# Patient Record
Sex: MALE | Race: BLACK OR AFRICAN AMERICAN | Employment: UNEMPLOYED | URBAN - METROPOLITAN AREA
[De-identification: names, ages, dates, MRNs, and addresses within clinical notes are randomized per-mention and may not be internally consistent; named-entity substitution may affect disease eponyms.]

---

## 2018-06-04 ENCOUNTER — HOSPITAL ENCOUNTER (EMERGENCY)
Age: 48
Discharge: HOME | End: 2018-06-04
Attending: EMERGENCY MEDICINE
Payer: MEDICARE

## 2018-06-04 VITALS
WEIGHT: 269 LBS | BODY MASS INDEX: 35.65 KG/M2 | SYSTOLIC BLOOD PRESSURE: 138 MMHG | OXYGEN SATURATION: 98 % | HEART RATE: 78 BPM | RESPIRATION RATE: 14 BRPM | HEIGHT: 73 IN | TEMPERATURE: 98.2 F | DIASTOLIC BLOOD PRESSURE: 82 MMHG

## 2018-06-04 DIAGNOSIS — R45.851 SUICIDAL THOUGHTS: Primary | ICD-10-CM

## 2018-06-04 LAB
AMPHET UR QL SCN: NEGATIVE
ANION GAP SERPL CALC-SCNC: 7 MMOL/L (ref 3–18)
APPEARANCE UR: CLEAR
BARBITURATES UR QL SCN: NEGATIVE
BASOPHILS # BLD: 0 K/UL (ref 0–0.1)
BASOPHILS NFR BLD: 0 % (ref 0–2)
BENZODIAZ UR QL: NEGATIVE
BILIRUB UR QL: NEGATIVE
BUN SERPL-MCNC: 11 MG/DL (ref 7–18)
BUN/CREAT SERPL: 12 (ref 12–20)
CALCIUM SERPL-MCNC: 9.2 MG/DL (ref 8.5–10.1)
CANNABINOIDS UR QL SCN: NEGATIVE
CHLORIDE SERPL-SCNC: 103 MMOL/L (ref 100–108)
CO2 SERPL-SCNC: 27 MMOL/L (ref 21–32)
COCAINE UR QL SCN: NEGATIVE
COLOR UR: YELLOW
CREAT SERPL-MCNC: 0.95 MG/DL (ref 0.6–1.3)
DIFFERENTIAL METHOD BLD: NORMAL
EOSINOPHIL # BLD: 0.2 K/UL (ref 0–0.4)
EOSINOPHIL NFR BLD: 3 % (ref 0–5)
ERYTHROCYTE [DISTWIDTH] IN BLOOD BY AUTOMATED COUNT: 14 % (ref 11.6–14.5)
ETHANOL SERPL-MCNC: <3 MG/DL (ref 0–3)
GLUCOSE SERPL-MCNC: 101 MG/DL (ref 74–99)
GLUCOSE UR STRIP.AUTO-MCNC: NEGATIVE MG/DL
HCT VFR BLD AUTO: 40.1 % (ref 36–48)
HDSCOM,HDSCOM: NORMAL
HGB BLD-MCNC: 13.5 G/DL (ref 13–16)
HGB UR QL STRIP: NEGATIVE
KETONES UR QL STRIP.AUTO: NEGATIVE MG/DL
LEUKOCYTE ESTERASE UR QL STRIP.AUTO: NEGATIVE
LYMPHOCYTES # BLD: 2.2 K/UL (ref 0.9–3.6)
LYMPHOCYTES NFR BLD: 24 % (ref 21–52)
MCH RBC QN AUTO: 28 PG (ref 24–34)
MCHC RBC AUTO-ENTMCNC: 33.7 G/DL (ref 31–37)
MCV RBC AUTO: 83 FL (ref 74–97)
METHADONE UR QL: NEGATIVE
MONOCYTES # BLD: 0.4 K/UL (ref 0.05–1.2)
MONOCYTES NFR BLD: 5 % (ref 3–10)
NEUTS SEG # BLD: 6.5 K/UL (ref 1.8–8)
NEUTS SEG NFR BLD: 68 % (ref 40–73)
NITRITE UR QL STRIP.AUTO: NEGATIVE
OPIATES UR QL: NEGATIVE
PCP UR QL: NEGATIVE
PH UR STRIP: 5.5 [PH] (ref 5–8)
PLATELET # BLD AUTO: 252 K/UL (ref 135–420)
PMV BLD AUTO: 9.9 FL (ref 9.2–11.8)
POTASSIUM SERPL-SCNC: 4.1 MMOL/L (ref 3.5–5.5)
PROT UR STRIP-MCNC: NEGATIVE MG/DL
RBC # BLD AUTO: 4.83 M/UL (ref 4.7–5.5)
SODIUM SERPL-SCNC: 137 MMOL/L (ref 136–145)
SP GR UR REFRACTOMETRY: 1.01 (ref 1–1.03)
UROBILINOGEN UR QL STRIP.AUTO: 0.2 EU/DL (ref 0.2–1)
WBC # BLD AUTO: 9.4 K/UL (ref 4.6–13.2)

## 2018-06-04 PROCEDURE — 80307 DRUG TEST PRSMV CHEM ANLYZR: CPT

## 2018-06-04 PROCEDURE — 85025 COMPLETE CBC W/AUTO DIFF WBC: CPT

## 2018-06-04 PROCEDURE — 99285 EMERGENCY DEPT VISIT HI MDM: CPT

## 2018-06-04 PROCEDURE — 80048 BASIC METABOLIC PNL TOTAL CA: CPT

## 2018-06-04 PROCEDURE — 81003 URINALYSIS AUTO W/O SCOPE: CPT

## 2018-06-04 NOTE — ED NOTES
I performed a brief evaluation, including history and physical, of the patient here in triage and I have determined that pt will need further treatment and evaluation from the main side ER physician. I have placed initial orders to help in expediting patients care.      June 04, 2018 at 3:12 PM - Debbie Bonner [place]

## 2018-06-04 NOTE — ED TRIAGE NOTES
Pt reports feeling sad and depressed and is having thoughts of hurting himself. Pt is without a plan. Pt does contract for safety.

## 2018-06-04 NOTE — ED PROVIDER NOTES
EMERGENCY DEPARTMENT HISTORY AND PHYSICAL EXAM    6:38 PM      Date: 6/4/2018  Patient Name: Alexandria Saleem    History of Presenting Illness     Chief Complaint   Patient presents with    Suicidal         History Provided By: Patient    Chief Complaint: Suicidal Ideations   Duration: Couple  Days  Timing:  Constant and Worsening  Location: N/A  Quality: N/A  Severity: N/A  Modifying Factors: None   Associated Symptoms: denies any other associated signs or symptoms      Additional History (Context): Alexandria Saleem is a 53 y.o. male with hx of DM, HLD and HIV presenting to the ED with c/o constant, worsening suicidal ideations that began a couple days ago. Pt reports suicidal thoughts began after he was released from [facility]. He states \"there are people accusing me of doing things, setting me up, they called the  on me and I was in [facility] for 2 months and 3 weeks for something I didn't do\". Denies HI, hallucinations, CP, SOB, fever, chills, abdominal pain, nausea, vomiting, diarrhea or urinary sx. As the patient is without complaints of pain, there is no severity of pain, quality of pain, modifying factors, or associated signs and symptoms regarding the pt's presenting complaint. No other sx or complaints given at this time. PCP: None        Past History     Past Medical History:  No past medical history on file. Past Surgical History:  No past surgical history on file. Family History:  No family history on file. Social History:  Social History   Substance Use Topics    Smoking status: Not on file    Smokeless tobacco: Not on file    Alcohol use Not on file       Allergies:  No Known Allergies      Review of Systems       Review of Systems   Constitutional: Negative for activity change and appetite change. HENT: Negative for congestion. Eyes: Negative for visual disturbance. Respiratory: Negative for cough and shortness of breath. Cardiovascular: Negative for chest pain.    Gastrointestinal: Negative for abdominal pain, diarrhea, nausea and vomiting. Genitourinary: Negative for dysuria. Musculoskeletal: Negative for arthralgias and myalgias. Skin: Negative for rash. Neurological: Negative for weakness and numbness. Psychiatric/Behavioral: Positive for suicidal ideas. Physical Exam     Visit Vitals    /82 (BP 1 Location: Left arm)    Pulse 78    Temp 98.2 °F (36.8 °C)    Resp 14    Ht 6' 1\" (1.854 m)    Wt 122 kg (269 lb)    SpO2 98%    BMI 35.49 kg/m2         Physical Exam   Constitutional: He is oriented to person, place, and time. He appears well-developed and well-nourished. HENT:   Head: Normocephalic and atraumatic. Mouth/Throat: Oropharynx is clear and moist.   Eyes: Conjunctivae are normal.   Neck: Normal range of motion. Cardiovascular: Normal rate. Pulmonary/Chest: Effort normal.   Abdominal: He exhibits no distension. Musculoskeletal: Normal range of motion. Neurological: He is alert and oriented to person, place, and time. Skin: Skin is warm and dry.    Psychiatric:   Suicidal without plan         Diagnostic Study Results     Labs -  Recent Results (from the past 12 hour(s))   URINALYSIS W/ RFLX MICROSCOPIC    Collection Time: 06/04/18  3:14 PM   Result Value Ref Range    Color YELLOW      Appearance CLEAR      Specific gravity 1.007 1.005 - 1.030      pH (UA) 5.5 5.0 - 8.0      Protein NEGATIVE  NEG mg/dL    Glucose NEGATIVE  NEG mg/dL    Ketone NEGATIVE  NEG mg/dL    Bilirubin NEGATIVE  NEG      Blood NEGATIVE  NEG      Urobilinogen 0.2 0.2 - 1.0 EU/dL    Nitrites NEGATIVE  NEG      Leukocyte Esterase NEGATIVE  NEG     DRUG SCREEN, URINE    Collection Time: 06/04/18  3:14 PM   Result Value Ref Range    BENZODIAZEPINES NEGATIVE  NEG      BARBITURATES NEGATIVE  NEG      THC (TH-CANNABINOL) NEGATIVE  NEG      OPIATES NEGATIVE  NEG      PCP(PHENCYCLIDINE) NEGATIVE  NEG      COCAINE NEGATIVE  NEG      AMPHETAMINES NEGATIVE  NEG      METHADONE NEGATIVE  NEG      HDSCOM (NOTE)    CBC WITH AUTOMATED DIFF    Collection Time: 06/04/18  6:45 PM   Result Value Ref Range    WBC 9.4 4.6 - 13.2 K/uL    RBC 4.83 4.70 - 5.50 M/uL    HGB 13.5 13.0 - 16.0 g/dL    HCT 40.1 36.0 - 48.0 %    MCV 83.0 74.0 - 97.0 FL    MCH 28.0 24.0 - 34.0 PG    MCHC 33.7 31.0 - 37.0 g/dL    RDW 14.0 11.6 - 14.5 %    PLATELET 831 657 - 280 K/uL    MPV 9.9 9.2 - 11.8 FL    NEUTROPHILS 68 40 - 73 %    LYMPHOCYTES 24 21 - 52 %    MONOCYTES 5 3 - 10 %    EOSINOPHILS 3 0 - 5 %    BASOPHILS 0 0 - 2 %    ABS. NEUTROPHILS 6.5 1.8 - 8.0 K/UL    ABS. LYMPHOCYTES 2.2 0.9 - 3.6 K/UL    ABS. MONOCYTES 0.4 0.05 - 1.2 K/UL    ABS. EOSINOPHILS 0.2 0.0 - 0.4 K/UL    ABS. BASOPHILS 0.0 0.0 - 0.1 K/UL    DF AUTOMATED     METABOLIC PANEL, BASIC    Collection Time: 06/04/18  6:45 PM   Result Value Ref Range    Sodium 137 136 - 145 mmol/L    Potassium 4.1 3.5 - 5.5 mmol/L    Chloride 103 100 - 108 mmol/L    CO2 27 21 - 32 mmol/L    Anion gap 7 3.0 - 18 mmol/L    Glucose 101 (H) 74 - 99 mg/dL    BUN 11 7.0 - 18 MG/DL    Creatinine 0.95 0.6 - 1.3 MG/DL    BUN/Creatinine ratio 12 12 - 20      GFR est AA >60 >60 ml/min/1.73m2    GFR est non-AA >60 >60 ml/min/1.73m2    Calcium 9.2 8.5 - 10.1 MG/DL   ETHYL ALCOHOL    Collection Time: 06/04/18  6:45 PM   Result Value Ref Range    ALCOHOL(ETHYL),SERUM <3 0 - 3 MG/DL       Radiologic Studies -   No orders to display         Medical Decision Making   I am the first provider for this patient. I reviewed the vital signs, available nursing notes, past medical history, past surgical history, family history and social history. Peggy Broderick is a 53 y.o. male with hx of DM, HLD and HIV presenting to the ED with c/o constant, worsening suicidal ideations that began a couple days ago. Pt reports suicidal thoughts began after he was released from nursing home. No plan. Appears well. Differential Diagnosis: suicidal thoughts without plan.  Do not suspect self harm or other acute illness. Testing: cbc, cmp, drug screen, etoh level  Treatments: psych eval pending medical clearance      Vital Signs-Reviewed the patient's vital signs. Pulse Oximetry Analysis -  99% on room air, stable     Cardiac Monitor:  Rate: 74  Rhythm:  Normal Sinus Rhythm     Records Reviewed: Nursing Notes and Old Medical Records (Time of Review: 6:38 PM)    ED Course: Progress Notes, Reevaluation, and Consults:    8:22 PM: Spoke with Crisis. Pt is medically cleared. 9:42 PM Consult: I discussed care with Crisis. It was a standard discussion including patient history, chief complaint, available diagnostic results, and predicted treatment course. States pt does not meet inpt criteria and he is to f/u with [facility] in [place] where he goes. Pt and family are in agreement with plan. Provider Notes (Medical Decision Making):       Diagnosis     Clinical Impression:   1. Suicidal thoughts        Disposition: Discharge     Follow-up Information     Follow up With Details Comments [address] [place] [address] [address] Schedule an appointment as soon as possible for a visit      [facility] EMERGENCY DEPT  If symptoms worsen [address] [place] [id number]  [phone]           Patient's Medications    No medications on file     _______________________________    Attestations:  Carlitos [address] [place] [address] acting as a scribe for and in the presence of Sherren Reilly, MD      June 04, 2018 at 6:38 PM       Provider Attestation:      I personally performed the services described in the documentation, reviewed the documentation, as recorded by the scribe in my presence, and it accurately and completely records my words and actions.  June 04, 2018 at 6:38 PM - Sherren Reilly, MD    _______________________________

## 2018-06-05 NOTE — DISCHARGE INSTRUCTIONS
Suicidal Thoughts and Behavior: Care Instructions  Your Care Instructions    You have been seen by a doctor because you've had thoughts about killing yourself. Maybe you have tried to do it. This is much different from having fleeting thoughts of death, which many people have from time to time. Your doctor and support team will work hard to help keep you safe. Your team may include a , a , and a counselor. Most people who think about suicide don't want to die. They think suicide will end their problems and pain. People who consider suicide often feel hopeless, helpless, and worthless. These thoughts can make a person feel that there is no other choice. But you do have a choice. Help is always available. The doctor and staff members are taking you and your pain very seriously. It is important to remember that there are people who are willing and able to talk with you about your suicidal thoughts. Treatment and close follow-up care can help you feel better about life. Thoughts of hopelessness and suicide may come from being depressed. Depression is a medical condition. When you have depression, there may be problems with activity levels in certain parts of your brain. Chemicals in your brain called neurotransmitters may be out of balance. But depression can be treated. Treatment for depression includes counseling, medicines, and lifestyle changes. With treatment, you can feel better. Your doctor doesn't want you to hurt yourself. He or she may ask you to sign a \"no harm\" agreement or contract. This contract is your promise that you will not hurt yourself between now and your next visit. Be completely honest with your doctor if you feel that you can't sign it. You will get help. Follow-up care is a key part of your treatment and safety. Be sure to make and go to all appointments, and call your doctor if you are having problems.  It's also a good idea to know your test results and keep a list of the medicines you take. How can you care for yourself at home? · Talk to someone. Reach out to family members, friends, your doctor, or a counselor. Be open and honest with them about your thoughts and feelings. · Be safe with medicines. Take your medicines exactly as prescribed. Call your doctor if you think you are having a problem with your medicine. · Avoid illegal drugs and alcohol. · Attend all counseling sessions recommended by your doctor. · Have someone take away sharp or dangerous objects, guns, and drugs from your home. · Keep the numbers for these national suicide hotlines: 7-921-399-TALK (0-356.294.3379) and 2-083-GEVKCFH (0-537.567.4027). When should you call for help? Call 911 anytime you think you may need emergency care. For example, call if:  ? · You feel you cannot stop from hurting yourself or someone else. ?Call your doctor now or seek immediate medical care if:  ? · You have one or more warning signs of suicide. For example, call if:  ¨ You feel like giving away your possessions. ¨ You use illegal drugs or drink alcohol heavily. ¨ You talk or write about death. This may include writing suicide notes and talking about guns, knives, or pills. ¨ You start to spend a lot of time alone or spend more time alone than usual.   ? · You hear voices. ? · You start acting in an aggressive way that's not normal for you. ? Watch closely for changes in your health, and be sure to contact your doctor if you have any problems. Where can you learn more? Go to http://deepthi-tani.info/. Enter I561 in the search box to learn more about \"Suicidal Thoughts and Behavior: Care Instructions. \"  Current as of: May 12, 2017  Content Version: 11.4  © 8340-4082 [employer]. Care instructions adapted under license by [employer] (which disclaims liability or warranty for this information).  If you have questions about a medical condition or this instruction, always ask your healthcare professional. [place] [address] any warranty or liability for your use of this information.

## 2018-06-05 NOTE — BSMART NOTE
Comprehensive Assessment Form Part 1    Section I - Disposition    Patient is pleasant and cooperative. He denies current thoughts of wanting to harm self/others. He is to follow up outpatient with [facility] [address] Section II - Integrated Summary    This is a 53year old male with a history of DM, HLD, HIV and Depression who was brought to the ED for a crisis evaluation. The person who brought him in has since left the ED. He was brought in for worsening suicidal ideations that started a couple of days ago. [facility] out of [facility] living [facility] after spending almost 3 months there for indecent exposure. He is currently living with his mother. He said his family encouraged him to come to the hospital because of his suicidal thoughts. He now says he has suicidal thoughts at times but has never actually done anything to harm himself and has never had an inpatient admission. When asked if he could be safe if he leaves her today, he said yes. He denies thoughts of wanting to harm others. He denies hallucinations and denies substance abuse. He said he sees a Dr. Laurie Patterson at [facility].                         Leah Roper RN